# Patient Record
Sex: FEMALE | Race: WHITE
[De-identification: names, ages, dates, MRNs, and addresses within clinical notes are randomized per-mention and may not be internally consistent; named-entity substitution may affect disease eponyms.]

---

## 2020-09-10 ENCOUNTER — HOSPITAL ENCOUNTER (INPATIENT)
Dept: HOSPITAL 74 - JDEL | Age: 20
LOS: 3 days | Discharge: HOME | DRG: 560 | End: 2020-09-13
Attending: OBSTETRICS & GYNECOLOGY | Admitting: OBSTETRICS & GYNECOLOGY
Payer: COMMERCIAL

## 2020-09-10 VITALS — BODY MASS INDEX: 27.3 KG/M2

## 2020-09-10 DIAGNOSIS — Z3A.39: ICD-10-CM

## 2020-09-10 DIAGNOSIS — O32.6XX0: Primary | ICD-10-CM

## 2020-09-10 PROCEDURE — U0003 INFECTIOUS AGENT DETECTION BY NUCLEIC ACID (DNA OR RNA); SEVERE ACUTE RESPIRATORY SYNDROME CORONAVIRUS 2 (SARS-COV-2) (CORONAVIRUS DISEASE [COVID-19]), AMPLIFIED PROBE TECHNIQUE, MAKING USE OF HIGH THROUGHPUT TECHNOLOGIES AS DESCRIBED BY CMS-2020-01-R: HCPCS

## 2020-09-11 LAB
ANION GAP SERPL CALC-SCNC: 8 MMOL/L (ref 8–16)
APTT BLD: 28.4 SECONDS (ref 25.2–36.5)
BASOPHILS # BLD: 0.1 % (ref 0–2)
BUN SERPL-MCNC: 10.9 MG/DL (ref 7–18)
CALCIUM SERPL-MCNC: 8.9 MG/DL (ref 8.5–10.1)
CHLORIDE SERPL-SCNC: 105 MMOL/L (ref 98–107)
CO2 SERPL-SCNC: 25 MMOL/L (ref 21–32)
CREAT SERPL-MCNC: 0.6 MG/DL (ref 0.55–1.3)
DEPRECATED RDW RBC AUTO: 13.2 % (ref 11.6–15.6)
EOSINOPHIL # BLD: 0.4 % (ref 0–4.5)
GLUCOSE SERPL-MCNC: 141 MG/DL (ref 74–106)
HCT VFR BLD CALC: 38.9 % (ref 32.4–45.2)
HGB BLD-MCNC: 13.3 GM/DL (ref 10.7–15.3)
INR BLD: 0.9 (ref 0.83–1.09)
LYMPHOCYTES # BLD: 13.6 % (ref 8–40)
MCH RBC QN AUTO: 32.2 PG (ref 25.7–33.7)
MCHC RBC AUTO-ENTMCNC: 34.3 G/DL (ref 32–36)
MCV RBC: 93.8 FL (ref 80–96)
MONOCYTES # BLD AUTO: 7.1 % (ref 3.8–10.2)
NEUTROPHILS # BLD: 78.8 % (ref 42.8–82.8)
PLATELET # BLD AUTO: 125 K/MM3 (ref 134–434)
PMV BLD: 11.2 FL (ref 7.5–11.1)
POTASSIUM SERPLBLD-SCNC: 3.7 MMOL/L (ref 3.5–5.1)
PT PNL PPP: 10.6 SEC (ref 9.7–13)
RBC # BLD AUTO: 4.15 M/MM3 (ref 3.6–5.2)
SODIUM SERPL-SCNC: 138 MMOL/L (ref 136–145)
WBC # BLD AUTO: 9.9 K/MM3 (ref 4–10)

## 2020-09-11 PROCEDURE — 0HQ9XZZ REPAIR PERINEUM SKIN, EXTERNAL APPROACH: ICD-10-PCS | Performed by: OBSTETRICS & GYNECOLOGY

## 2020-09-11 RX ADMIN — AMPICILLIN SCH MLS/HR: 1 INJECTION, POWDER, FOR SOLUTION INTRAMUSCULAR; INTRAVENOUS at 03:30

## 2020-09-11 RX ADMIN — IBUPROFEN PRN MG: 600 TABLET, FILM COATED ORAL at 14:04

## 2020-09-11 RX ADMIN — Medication SCH TAB: at 11:10

## 2020-09-11 RX ADMIN — AMPICILLIN SCH: 1 INJECTION, POWDER, FOR SOLUTION INTRAMUSCULAR; INTRAVENOUS at 08:49

## 2020-09-11 RX ADMIN — IBUPROFEN PRN MG: 600 TABLET, FILM COATED ORAL at 09:25

## 2020-09-11 RX ADMIN — ACETAMINOPHEN PRN MG: 325 TABLET ORAL at 09:25

## 2020-09-11 RX ADMIN — FERROUS SULFATE TAB EC 324 MG (65 MG FE EQUIVALENT) SCH MG: 324 (65 FE) TABLET DELAYED RESPONSE at 21:32

## 2020-09-11 RX ADMIN — FERROUS SULFATE TAB EC 324 MG (65 MG FE EQUIVALENT) SCH MG: 324 (65 FE) TABLET DELAYED RESPONSE at 11:10

## 2020-09-11 RX ADMIN — ACETAMINOPHEN PRN MG: 325 TABLET ORAL at 14:03

## 2020-09-11 NOTE — PN
Delivery





- Delivery


Vaginal Delivery: No Problems


Type of Anesthesia: Epidural


Episiotomy/Laceration: Periurethral Extnsion/lac


EBL (cc): 300 ( live baby boy, APGARS 9/9, placenta deliverd spontanous 

intact, 1st degree laceration repaired with 2-O chromic)





Delivery, Single Birth





- Stages of Labor


Placenta: Yes: Spontaneous





- Condition of Infant


Pediatrician/Neonatologist Present: No


Infant Gender: Male


Position: OA ( live baby boy compound prsentation, APGARS 9/9, placenta 

deliverd spontanous intact, 1st degree laceration repaired with 2-O chromic)





-  Feeding Plan


Initial Plan: Elected not to breastfeed exclusively throughout hospitalization





Remarks





- Remarks


Remarks: 





 live baby boy compound presentation, APGARS 9/9, placenta delivered 

spontaneous intact, 1st degree laceration repaired with 2-O chromic, EBL 300ml

## 2020-09-11 NOTE — HP
Past Medical History





- Admission


History Source: Patient, Medical Record, Transfer Record





- Past Medical History


...: 1


...Para: 0


...Term: 0


...: 0


...Spon : 0


...Induced : 0


...Living Children: 0


...Multiple Gestation: 0


...LMP: 19


... Weeks Gestation by Dates: 40.4


...EDC by Dates: 20


...EDC by Sono: 20





- Past Surgical History


Hx Myomectomy: No


Hx Transabdominal Cerclage: No





- Smoking History


Smoking history: Never smoked


Have you smoked in the past 12 months: No





- Alcohol/Substance Use


Hx Alcohol Use: No





- Social History


Usual Living Arrangement: Yes: With Significant Other (20 Y/O  @ 40 +4 weks 

prsented complaning of contracrions)





Home Medications





- Allergies


Allergies/Adverse Reactions: 


                                    Allergies











Allergy/AdvReac Type Severity Reaction Status Date / Time


 


No Known Allergies Allergy   Verified 20 23:10














- Home Medications


Home Medications: 


Ambulatory Orders





Prenatal One Tablet 1 tab PO DAILY 20 











Review of Systems





- Review of Systems


Constitutional: reports: No Symptoms





Physical Exam - Maternity


Vital Signs: 


                                   Vital Signs











Temperature  98.2 F   20 03:00


 


Pulse Rate  102 H  20 03:00


 


Respiratory Rate  20   20 03:00


 


Blood Pressure  138/82   20 03:00


 


O2 Sat by Pulse Oximetry (%)      














- Abdominal Exam/OB


Fundal Height: 40


Number of Fetuses: Single


Fetal Presentation: Vertex


Contractions: Yes


Regularity: Irregular


Intensity: Mod/Strong


Fetal Heart Rate (range): 140


Category: I


Accelerations: Uniform


Decelerations: None





- Vaginal Exam/OB


Vaginal Bleeding: No





- Labs


Lab Results: 


                                    CBC, BMP





                                 20 00:15 





                                 20 00:15 











Problem List





- Problems


(1) 39 weeks gestation of pregnancy


Code(s): Z3A.39 - 39 WEEKS GESTATION OF PREGNANCY   





Assessment/Plan





20 Y/O  @ 39 weeks presented complaining of contractions, patient had 

irregular contractions Intial exam 1/L/P, then had SROM clear


Patient admoted fro early labor with SROM, Abx ampicillin started for H/O GBS 

bacterurea 


VE: 9.5/100/-3, will labor down


[place]: Q2 min


FHR: Catg I


A/P  IUP  39+ SROM in labor


Labor down


Patient requesting epidural, anesthesia called

## 2020-09-11 NOTE — PN
Progress Note (short form)





- Note


Progress Note: 





Patient examiend at bed side S/P epidural


VE 10/100/0


[place] Irregular contractions


FHR: Catg I


A/P will start pitocin augmentation


start pushing


Anticipating 





Problem List





- Problems


(1) 39 weeks gestation of pregnancy


Code(s): Z3A.39 - 39 WEEKS GESTATION OF PREGNANCY

## 2020-09-12 VITALS — HEART RATE: 69 BPM | TEMPERATURE: 97.5 F

## 2020-09-12 LAB
BASOPHILS # BLD: 0.3 % (ref 0–2)
DEPRECATED RDW RBC AUTO: 13.5 % (ref 11.6–15.6)
EOSINOPHIL # BLD: 0.2 % (ref 0–4.5)
HCT VFR BLD CALC: 36.1 % (ref 32.4–45.2)
HGB BLD-MCNC: 11.9 GM/DL (ref 10.7–15.3)
LYMPHOCYTES # BLD: 13.8 % (ref 8–40)
MCH RBC QN AUTO: 31.1 PG (ref 25.7–33.7)
MCHC RBC AUTO-ENTMCNC: 33 G/DL (ref 32–36)
MCV RBC: 94.1 FL (ref 80–96)
MONOCYTES # BLD AUTO: 6.9 % (ref 3.8–10.2)
NEUTROPHILS # BLD: 78.8 % (ref 42.8–82.8)
PLATELET # BLD AUTO: 113 K/MM3 (ref 134–434)
PMV BLD: 10.4 FL (ref 7.5–11.1)
RBC # BLD AUTO: 3.83 M/MM3 (ref 3.6–5.2)
WBC # BLD AUTO: 14.4 K/MM3 (ref 4–10)

## 2020-09-12 RX ADMIN — Medication SCH TAB: at 09:12

## 2020-09-12 RX ADMIN — FERROUS SULFATE TAB EC 324 MG (65 MG FE EQUIVALENT) SCH MG: 324 (65 FE) TABLET DELAYED RESPONSE at 09:12

## 2020-09-12 RX ADMIN — FERROUS SULFATE TAB EC 324 MG (65 MG FE EQUIVALENT) SCH MG: 324 (65 FE) TABLET DELAYED RESPONSE at 21:09

## 2020-09-12 NOTE — DS
Physical Exam-GYN


Vital Signs: 


                                   Vital Signs











Temperature  99 F   20 01:54


 


Pulse Rate  59 L  20 01:54


 


Respiratory Rate  20   20 01:54


 


Blood Pressure  131/72   20 01:54


 


O2 Sat by Pulse Oximetry (%)  100   20 01:54











Constitutional: Yes: Well Nourished


Eyes: Yes: WNL


HENT: Yes: WNL


Neck: Yes: WNL


Cardiovascular: Yes: WNL


Respiratory: Yes: WNL


Gastrointestinal: Yes: WNL


...Rectal Exam: Yes: WNL


Renal/: Yes: WNL (PPD#1, Patient seen and examined at bed side, S/P  doing

well, has no complaints, patient ambulating, voiding, passed flatus. PE: AO X3 

in NAD HEENT: NC/AT, Supple Chest: CTA BL ABDOMEN : Soft, NT, ND, Uterus Firm 

below the umbilicus,  +ve BS EXT: negative Rayray's BL Vagina: Normal Lochia VS: 

WNL PP: Pending A/P PPD#1 S/P  doing well, has no complaints OOB Ambulating,

voiding, passed flatus. Patient wants Baby Circumcision, informed consent 

obtained and witnessed. Will Dc patient home tomorrow if continue to be stable 

F/U in [facility] in 6 weeks for PP check.)


Pelvis: Yes: WNL


External Genitalia: Yes: Normal (PPD#1, Patient seen and examined at bed side, 

S/P  doing well, has no complaints, patient ambulating, voiding, passed 

flatus. PE: AO X3 in NAD HEENT: NC/AT, Supple Chest: CTA BL ABDOMEN : Soft, NT, 

ND, Uterus Firm below the umbilicus,  +ve BS EXT: negative Rayray's BL Vagina: 

Normal Lochia VS: WNL PP: Pending A/P PPD#1 S/P  doing well, has no 

complaints OOB Ambulating, voiding, passed flatus. Patient wants Baby 

Circumcision, informed consent obtained and witnessed. Will Dc patient home 

tomorrow if continue to be stable F/U in [facility] in 6 weeks for PP check.)


Uterus: Yes: Firm


....Post Partum: Yes: Uterus firm, Uterus non-tender, Slight lochia rubra


Breast(s): Yes: WNL


Labs: 


                                    CBC, BMP





                                 20 00:15 





                                 20 00:15 











Delivery





- Delivery


Vaginal Delivery: No Problems


Type of Anesthesia: Epidural


Episiotomy/Laceration: Periurethral Extnsion/lac


EBL (cc): 300





Delivery, Single Birth





- Stages of Labor


Date 1st Stage Initiatied: 09/10/20


Time 1st Stage Initiated: 19:00


Date 2nd Stage Initiated: 09/10/20


Time 2nd Stage Initiated: 23:25


Date of Delivery: 20


Time of Delivery: 06:59


Time Placenta Delivered: 07:04


Placenta: Yes: Spontaneous





- Condition of Infant


Pediatrician/Neonatologist Present: No


Infant Gender: Male


Birth Weight: 3.629 kg


Position: OA


Total Hours ROM (Hrs/Mins): 7 HOURS/ 39 MINUTES





- Apgar


  ** 1 Minute


Apgar Total Score: 9





  ** 5 Minutes


Apgar Total Score: 9





- [place] Feeding Plan


Initial Plan: Elected not to breastfeed exclusively throughout hospitalization





Remarks





- Remarks


Remarks: 





PPD#1, Patient seen and examined at bed side, S/P  doing well, has no 

complaints, patient ambulating, voiding, passed flatus.


PE: AO X3 in NAD


HEENT: NC/AT, Supple


Chest: CTA BL


ABDOMEN : Soft, NT, ND, Uterus Firm below the umbilicus,  +ve BS


EXT: negative Rayray's BL


Vagina: Normal Lochia


VS: WNL


PP: Pending


A/P


PPD#1 S/P  doing well, has no complaints


OOB


Ambulating, voiding, passed flatus.


Patient wants Baby Circumcision, informed consent obtained and witnessed.


Will Dc patient home tomorrow if continue to be stable


F/U in [facility] in 6 weeks for PP check.





Discharge Summary


Problems reviewed: Yes


Reason For Visit: LABOR ADMIT


Procedures: Principal: PPD#1, Patient seen and examined at bed side, S/P  

doing well, has no complaints, patient ambulating, voiding, passed flatus.  PE: 

AO X3 in NAD.  HEENT: NC/AT, Supple.  Chest: CTA BL.  ABDOMEN : Soft, NT, ND, 

Uterus Firm below the umbilicus,  +ve BS.  EXT: negative Rayray's BL.  Vagina: 

Normal Lochia.  VS: WNL.  PP: Pending.  A/P.  PPD#1 S/P  doing well, has no 

complaints.  OOB.  Ambulating, voiding, passed flatus.  Patient wants Baby 

Circumcision, informed consent obtained and witnessed.  Will Dc patient home 

tomorrow if continue to be stable.  F/U in [facility] in 6 weeks for PP check.


Hospital Course: 





Condition: Stable





- Instructions


Disposition: HOME





- Home Medications


Comprehensive Discharge Medication List: 


Ambulatory Orders





Prenatal One Tablet 1 tab PO DAILY 20 








Prescription Drug Monitoring Program (I-STOP) results: I-STOP reviewed and no i

ssues identified

## 2020-09-12 NOTE — PN
Post Partum Progress Note


Type of Delivery: 


Vital Signs: 


                                   Vital Signs











Temperature  99 F   20 01:54


 


Pulse Rate  59 L  20 01:54


 


Respiratory Rate  20   20 01:54


 


Blood Pressure  131/72   20 01:54


 


O2 Sat by Pulse Oximetry (%)  100   20 01:54











Breast Exam: Yes: Soft


Uterus: Yes: Fundus Firm


Abdomen/GI: Yes: Abdomen soft


Lochia: Yes: Rubra


Lochia, amount: Small


Extremities: Yes: Calves non-tender


Perineum: Yes: Intact


Activity: Ambulating





- Labs


Labs: 


                                       CBC











WBC  9.9 K/mm3 (4.0-10.0)   20  00:15    


 


RBC  4.15 M/mm3 (3.60-5.2)   20  00:15    


 


Hgb  13.3 GM/dL (10.7-15.3)   20  00:15    


 


Hct  38.9 % (32.4-45.2)   20  00:15    


 


MCV  93.8 fl (80-96)   20  00:15    


 


MCH  32.2 pg (25.7-33.7)   20  00:15    


 


MCHC  34.3 g/dl (32.0-36.0)   20  00:15    


 


RDW  13.2 % (11.6-15.6)   20  00:15    


 


Plt Count  125 K/MM3 (134-434)  L  20  00:15    


 


MPV  11.2 fl (7.5-11.1)  H  20  00:15    


 


Absolute Neuts (auto)  7.8 K/mm3 (1.5-8.0)   20  00:15    


 


Neutrophils %  78.8 % (42.8-82.8)   20  00:15    


 


Lymphocytes %  13.6 % (8-40)   20  00:15    


 


Monocytes %  7.1 % (3.8-10.2)   20  00:15    


 


Eosinophils %  0.4 % (0-4.5)   20  00:15    


 


Basophils %  0.1 % (0-2.0)   20  00:15    


 


Nucleated RBC %  0 % (0-0)   20  00:15    














Problem List





- Problems


(1) 39 weeks gestation of pregnancy


Assessment/Plan: 


PPD#1, Patient seen and examined at bed side, S/P  doing well, has no 

complaints, patient ambulating, voiding, passed flatus.


PE: AO X3 in NAD


HEENT: NC/AT, Supple


Chest: CTA BL


ABDOMEN : Soft, NT, ND, Uterus Firm below the umbilicus,  +ve BS


EXT: negative Rayray's BL


Vagina: Normal Lochia


VS: WNL


PP: Pending


A/P


PPD#1 S/P  doing well, has no complaints


OOB


Ambulating, voiding, passed flatus.


Patient wants Baby Circumcision, informed consent obtained and witnessed.


Will Dc patient home tomorrow if continue to be stable


F/U in [facility] in 6 weeks for PP check.














Problems reviewed: Yes   


Code(s): Z3A.39 - 39 WEEKS GESTATION OF PREGNANCY

## 2020-09-13 VITALS — SYSTOLIC BLOOD PRESSURE: 130 MMHG | DIASTOLIC BLOOD PRESSURE: 85 MMHG

## 2020-09-13 RX ADMIN — ACETAMINOPHEN PRN MG: 325 TABLET ORAL at 07:56

## 2020-09-13 RX ADMIN — FERROUS SULFATE TAB EC 324 MG (65 MG FE EQUIVALENT) SCH MG: 324 (65 FE) TABLET DELAYED RESPONSE at 10:28

## 2020-09-13 RX ADMIN — Medication SCH TAB: at 10:28

## 2020-09-13 RX ADMIN — IBUPROFEN PRN MG: 600 TABLET, FILM COATED ORAL at 07:56

## 2020-09-13 NOTE — PN
Progress Note (short form)





- Note


Progress Note: 





PPD#2, Patient seen and examined at bed side, S/P  doing well, has no 

complaints, patient ambulating, voiding, passed flatus.


PE: AO X3 in NAD


HEENT: NC/AT, Supple


Chest: CTA BL


ABDOMEN : Soft, NT, ND, Uterus Firm below the umbilicus,  +ve BS


EXT: negative Rayray's BL


Vagina: Normal Lochia


VS: WNL


PP: 11.5/36.5


A/P


PPD#2 S/P  doing well, has no complaints


OOB


Ambulating, voiding, passed flatus & BM.


Baby Circumcision done


Will Dc patient home today as continue to be stable


F/U in [facility] in 6 weeks for PP check.


                                    CBC, BMP





                                 20 08:08 





                                 20 00:15 











Problem List





- Problems


(1) 39 weeks gestation of pregnancy


Code(s): Z3A.39 - 39 WEEKS GESTATION OF PREGNANCY

## 2021-06-13 ENCOUNTER — HOSPITAL ENCOUNTER (EMERGENCY)
Dept: HOSPITAL 74 - JER | Age: 21
LOS: 1 days | Discharge: HOME | End: 2021-06-14
Payer: COMMERCIAL

## 2021-06-13 VITALS — BODY MASS INDEX: 20.3 KG/M2

## 2021-06-13 VITALS — TEMPERATURE: 98.6 F

## 2021-06-13 LAB
BASOPHILS # BLD: 0.4 % (ref 0–2)
BILIRUB UR STRIP.AUTO-MCNC: (no result) MG/DL
DEPRECATED RDW RBC AUTO: 13.3 % (ref 11.6–15.6)
EOSINOPHIL # BLD: 1.4 % (ref 0–4.5)
HCT VFR BLD CALC: 41.4 % (ref 32.4–45.2)
HGB BLD-MCNC: 14.1 GM/DL (ref 10.7–15.3)
LYMPHOCYTES # BLD: 24.5 % (ref 8–40)
MCH RBC QN AUTO: 30.9 PG (ref 25.7–33.7)
MCHC RBC AUTO-ENTMCNC: 34.1 G/DL (ref 32–36)
MCV RBC: 90.5 FL (ref 80–96)
MONOCYTES # BLD AUTO: 8.9 % (ref 3.8–10.2)
NEUTROPHILS # BLD: 64.8 % (ref 42.8–82.8)
PH UR: 8.5 [PH] (ref 5–8)
PLATELET # BLD AUTO: 228 K/MM3 (ref 134–434)
PMV BLD: 8.3 FL (ref 7.5–11.1)
PROT UR QL STRIP: (no result)
RBC # BLD AUTO: 4.58 M/MM3 (ref 3.6–5.2)
SP GR UR: 1.01 (ref 1.01–1.03)
UROBILINOGEN UR STRIP-MCNC: 1 MG/DL (ref 0.2–1)
WBC # BLD AUTO: 9.1 K/MM3 (ref 4–10)

## 2021-06-14 VITALS — SYSTOLIC BLOOD PRESSURE: 109 MMHG | HEART RATE: 101 BPM | DIASTOLIC BLOOD PRESSURE: 65 MMHG
